# Patient Record
Sex: MALE | Race: WHITE | NOT HISPANIC OR LATINO | URBAN - METROPOLITAN AREA
[De-identification: names, ages, dates, MRNs, and addresses within clinical notes are randomized per-mention and may not be internally consistent; named-entity substitution may affect disease eponyms.]

---

## 2020-02-18 ENCOUNTER — OFFICE VISIT - HEALTHEAST (OUTPATIENT)
Dept: UROLOGY | Facility: CLINIC | Age: 75
End: 2020-02-18

## 2020-02-18 ENCOUNTER — AMBULATORY - HEALTHEAST (OUTPATIENT)
Dept: UROLOGY | Facility: CLINIC | Age: 75
End: 2020-02-18

## 2020-02-18 DIAGNOSIS — N20.1 CALCULUS OF URETER: ICD-10-CM

## 2020-02-18 LAB
ALBUMIN UR-MCNC: NEGATIVE MG/DL
APPEARANCE UR: CLEAR
BILIRUB UR QL STRIP: NEGATIVE
COLOR UR AUTO: YELLOW
GLUCOSE UR STRIP-MCNC: NEGATIVE MG/DL
HGB UR QL STRIP: ABNORMAL
KETONES UR STRIP-MCNC: NEGATIVE MG/DL
LEUKOCYTE ESTERASE UR QL STRIP: NEGATIVE
NITRATE UR QL: NEGATIVE
PH UR STRIP: 7 [PH] (ref 5–8)
SP GR UR STRIP: 1.02 (ref 1–1.03)
UROBILINOGEN UR STRIP-ACNC: ABNORMAL

## 2020-02-18 RX ORDER — ZALEPLON 5 MG/1
5 CAPSULE ORAL
Status: SHIPPED | COMMUNITY
Start: 2019-08-29

## 2020-02-18 RX ORDER — METOPROLOL TARTRATE 50 MG
50 TABLET ORAL
Status: SHIPPED | COMMUNITY
Start: 2018-11-12

## 2020-02-18 RX ORDER — TRAZODONE HYDROCHLORIDE 50 MG/1
100 TABLET, FILM COATED ORAL
Status: SHIPPED | COMMUNITY
Start: 2020-02-01

## 2020-02-18 RX ORDER — ROSUVASTATIN CALCIUM 5 MG/1
5 TABLET, COATED ORAL
Status: SHIPPED | COMMUNITY
Start: 2019-12-14

## 2020-02-18 RX ORDER — ZOLPIDEM TARTRATE 5 MG/1
5 TABLET ORAL
Status: SHIPPED | COMMUNITY
Start: 2019-09-04

## 2020-02-18 RX ORDER — HYDROCORTISONE 25 MG/G
OINTMENT TOPICAL
Status: SHIPPED | COMMUNITY
Start: 2019-03-14

## 2020-02-18 RX ORDER — HYDROCODONE BITARTRATE AND ACETAMINOPHEN 5; 325 MG/1; MG/1
1 TABLET ORAL
Status: SHIPPED | COMMUNITY
Start: 2020-02-06

## 2020-02-18 RX ORDER — TADALAFIL 20 MG/1
20 TABLET ORAL
Status: SHIPPED | COMMUNITY
Start: 2018-06-05

## 2020-02-18 RX ORDER — ALPRAZOLAM 0.25 MG
0.25 TABLET ORAL
Status: SHIPPED | COMMUNITY
Start: 2018-10-30

## 2020-02-18 RX ORDER — LORAZEPAM 0.5 MG/1
0.5 TABLET ORAL
Status: SHIPPED | COMMUNITY
Start: 2019-08-29

## 2020-02-18 ASSESSMENT — MIFFLIN-ST. JEOR: SCORE: 1439.79

## 2020-02-25 ENCOUNTER — OFFICE VISIT - HEALTHEAST (OUTPATIENT)
Dept: UROLOGY | Facility: CLINIC | Age: 75
End: 2020-02-25

## 2020-02-25 ENCOUNTER — HOSPITAL ENCOUNTER (OUTPATIENT)
Dept: CT IMAGING | Facility: CLINIC | Age: 75
Discharge: HOME | End: 2020-02-25
Attending: UROLOGY

## 2020-02-25 DIAGNOSIS — N20.0 CALCULUS OF KIDNEY: ICD-10-CM

## 2020-02-25 DIAGNOSIS — N20.1 CALCULUS OF URETER: ICD-10-CM

## 2020-02-25 LAB
ALBUMIN UR-MCNC: NEGATIVE MG/DL
APPEARANCE UR: CLEAR
BILIRUB UR QL STRIP: NEGATIVE
COLOR UR AUTO: YELLOW
GLUCOSE UR STRIP-MCNC: NEGATIVE MG/DL
HGB UR QL STRIP: ABNORMAL
KETONES UR STRIP-MCNC: NEGATIVE MG/DL
LEUKOCYTE ESTERASE UR QL STRIP: NEGATIVE
NITRATE UR QL: NEGATIVE
PH UR STRIP: 5 [PH] (ref 5–8)
SP GR UR STRIP: 1.02 (ref 1–1.03)
UROBILINOGEN UR STRIP-ACNC: ABNORMAL

## 2020-09-10 ENCOUNTER — OFFICE VISIT - HEALTHEAST (OUTPATIENT)
Dept: UROLOGY | Facility: CLINIC | Age: 75
End: 2020-09-10

## 2020-09-10 ENCOUNTER — HOSPITAL ENCOUNTER (OUTPATIENT)
Dept: CT IMAGING | Facility: CLINIC | Age: 75
Discharge: HOME | End: 2020-09-10
Attending: UROLOGY

## 2020-09-10 DIAGNOSIS — N20.0 CALCULUS OF KIDNEY: ICD-10-CM

## 2021-02-11 ENCOUNTER — HOSPITAL ENCOUNTER (OUTPATIENT)
Dept: CT IMAGING | Facility: HOSPITAL | Age: 76
Discharge: HOME | End: 2021-02-11

## 2021-02-11 ENCOUNTER — OFFICE VISIT - HEALTHEAST (OUTPATIENT)
Dept: UROLOGY | Facility: CLINIC | Age: 76
End: 2021-02-11

## 2021-02-11 DIAGNOSIS — N20.0 CALCULUS OF KIDNEY: ICD-10-CM

## 2021-02-11 DIAGNOSIS — N20.1 CALCULUS OF URETER: ICD-10-CM

## 2021-02-12 RX ORDER — OXYCODONE HYDROCHLORIDE 5 MG/1
5-10 TABLET ORAL EVERY 6 HOURS PRN
Qty: 15 TABLET | Refills: 0 | Status: SHIPPED | OUTPATIENT
Start: 2021-02-12

## 2021-02-12 RX ORDER — ONDANSETRON 4 MG/1
4 TABLET, ORALLY DISINTEGRATING ORAL EVERY 6 HOURS PRN
Qty: 10 TABLET | Refills: 1 | Status: SHIPPED | OUTPATIENT
Start: 2021-02-12

## 2021-02-25 ENCOUNTER — OFFICE VISIT - HEALTHEAST (OUTPATIENT)
Dept: UROLOGY | Facility: CLINIC | Age: 76
End: 2021-02-25

## 2021-02-25 ENCOUNTER — HOSPITAL ENCOUNTER (OUTPATIENT)
Dept: CT IMAGING | Facility: HOSPITAL | Age: 76
Discharge: HOME | End: 2021-02-25
Attending: UROLOGY

## 2021-02-25 DIAGNOSIS — N20.1 CALCULUS OF URETER: ICD-10-CM

## 2021-03-01 ENCOUNTER — COMMUNICATION - HEALTHEAST (OUTPATIENT)
Dept: UROLOGY | Facility: CLINIC | Age: 76
End: 2021-03-01

## 2021-03-11 ENCOUNTER — OFFICE VISIT - HEALTHEAST (OUTPATIENT)
Dept: UROLOGY | Facility: CLINIC | Age: 76
End: 2021-03-11

## 2021-03-11 ENCOUNTER — HOSPITAL ENCOUNTER (OUTPATIENT)
Dept: CT IMAGING | Facility: HOSPITAL | Age: 76
Discharge: HOME | End: 2021-03-11
Attending: UROLOGY

## 2021-03-11 DIAGNOSIS — N20.1 CALCULUS OF URETER: ICD-10-CM

## 2021-03-11 DIAGNOSIS — N20.0 CALCULUS OF KIDNEY: ICD-10-CM

## 2021-05-27 VITALS — DIASTOLIC BLOOD PRESSURE: 70 MMHG | SYSTOLIC BLOOD PRESSURE: 130 MMHG | HEART RATE: 81 BPM | TEMPERATURE: 97.6 F

## 2021-06-04 VITALS
WEIGHT: 168 LBS | SYSTOLIC BLOOD PRESSURE: 153 MMHG | TEMPERATURE: 97.7 F | BODY MASS INDEX: 27 KG/M2 | HEART RATE: 65 BPM | DIASTOLIC BLOOD PRESSURE: 73 MMHG | HEIGHT: 66 IN

## 2021-06-06 NOTE — PROGRESS NOTES
Assessment/Plan:        Diagnoses and all orders for this visit:    Calculus of ureter  -     Urinalysis Macroscopic  -     Symptom Control While Passing a Stone Education  -     Patient Stated Goal: Pass my stone  -     CT Abdomen Pelvis Without Oral Without IV Contrast; Future; Expected date: 02/25/2020    Other orders  -     ALPRAZolam (XANAX) 0.25 MG tablet; Take 0.25 mg by mouth.  -     escitalopram oxalate (LEXAPRO) 10 MG tablet; Take 10 mg by mouth.  -     HYDROcodone-acetaminophen 5-325 mg per tablet; Take 1 tablet by mouth.  -     hydrocortisone 2.5 % ointment; APPLY TO AFFECTED AREA ON THE NECK TWICE A DAY AS NEEDED FOR PAIN FOR UP TO 2 WEEKS.  -     LORazepam (ATIVAN) 0.5 MG tablet; Take 0.5 mg by mouth.  -     metoprolol tartrate (LOPRESSOR) 50 MG tablet; Take 50 mg by mouth.  -     rosuvastatin (CRESTOR) 5 MG tablet; Take 5 mg by mouth.  -     tadalafiL (CIALIS) 20 MG tablet; Take 20 mg by mouth.  -     tamsulosin (FLOMAX) 0.4 mg cap; Take 0.4 mg by mouth.  -     traZODone (DESYREL) 50 MG tablet; Take 100 mg by mouth.  -     zaleplon (SONATA) 5 MG capsule; Take 5 mg by mouth.  -     zolpidem (AMBIEN) 5 MG tablet; Take 5 mg by mouth.      Stone Management Plan  [facility] [place] Stone [facility] 2/18/2020   Urinary Tract Infection No suspicion of infection   Renal Colic Asymptomatic at this time   Renal Failure No suspicion of renal failure   Current CT date 2/7/2020   Right sided stones? Yes   R Number of ureteral stones 1   R GSD of ureteral stones 3   R Location of ureteral stone Proximal   R Number of kidney stones  No renal stones   R Hydronephrosis Mild   R Stone Event New event   Diagnosis date 2/7/2020   Initial location of primary symptomatic stone Proximal   Initial GSD of primary symptomatic stone 3   R MET status Initiation   R Current Plan MET   MET 1 week F/U   Left sided stones? Yes   L Number of ureteral stones No ureteral stones   L Number of kidney stones  1   L GSD of kidney stones 4 - 10   L  Hydronephrosis None   L Stone Event No current event   L Current Plan Observe   Observe rationale Significant stone burden amenable to emergency management             Subjective:      HPI  Mr. Dustin Stewart is a 74 y.o.  male presenting to the [facility] [place] for a new problem.    He is a recurrent stone former who recently had some painless hematuria. He has had ESWL x1 and URS x 2 in the past. Most recent stone episode was about 15 years ago. There is a family history of gout and he has been on allopurinol although his stone composition is not known. Currently no pain and hematuria has resolved. He is a practicing psychiatrist.    CT scan is personally reviewed and demonstrates a 3 mm right proximal stone with minimal hydronephrosis. There is a 7 mm peripheral stone in the left kidney.    PLAN  Will proceed with medical expulsive therapy. Risks and benefits were detailed of medical expulsive therapy including probability of stone passage, recurrent renal colic, and requirement of emergency medical and/or surgical care and further imaging. Patient verbalized understanding. Patient agrees with plan as discussed. He will return in 1 weeks with low dose CT scan.    There are plans to travel to [facility] in early March.    Over the counter symptom control medications of ibuprofen, Dramamine and Tylenol were recommended.     ROS   Review of Systems  A 12 point comprehensive review of systems is negative except for HPI    Past Medical History:   Diagnosis Date     Anxiety      Arrhythmia     svt     Cancer (H)     prostate     Depression      Kidney stone     x4 occ     Osteoarthritis        Past Surgical History:   Procedure Laterality Date     ACHILLES TENDON REPAIR Left      EXTRACORPOREAL SHOCK WAVE LITHOTRIPSY       HIP SURGERY Right      KNEE SURGERY Bilateral      LITHOTRIPSY  2004     AL ANESTH,REPAIR UPPER ABD HERNIA NOS       PROSTATE SURGERY       ROTATOR CUFF REPAIR Right       TONSILLECTOMY AND ADENOIDECTOMY       URETEROSCOPY         Current Outpatient Medications   Medication Sig Dispense Refill     ALPRAZolam (XANAX) 0.25 MG tablet Take 0.25 mg by mouth.       escitalopram oxalate (LEXAPRO) 10 MG tablet Take 10 mg by mouth.       HYDROcodone-acetaminophen 5-325 mg per tablet Take 1 tablet by mouth.       hydrocortisone 2.5 % ointment APPLY TO AFFECTED AREA ON THE NECK TWICE A DAY AS NEEDED FOR PAIN FOR UP TO 2 WEEKS.       LORazepam (ATIVAN) 0.5 MG tablet Take 0.5 mg by mouth.       metoprolol tartrate (LOPRESSOR) 50 MG tablet Take 50 mg by mouth.       rosuvastatin (CRESTOR) 5 MG tablet Take 5 mg by mouth.       tadalafiL (CIALIS) 20 MG tablet Take 20 mg by mouth.       tamsulosin (FLOMAX) 0.4 mg cap Take 0.4 mg by mouth.       traZODone (DESYREL) 50 MG tablet Take 100 mg by mouth.       zaleplon (SONATA) 5 MG capsule Take 5 mg by mouth.       zolpidem (AMBIEN) 5 MG tablet Take 5 mg by mouth.       No current facility-administered medications for this visit.        Allergies   Allergen Reactions     Atorvastatin Myalgia     Ceftriaxone Rash     NOTICED A RASH AFTER IV ANTIBIOTIC GIVEN LAST WEEK WHEN HE WAS ADMITTED-THIS WAS THE ONLY MED. FOUND ON ORDERS       Social History     Socioeconomic History     Marital status:      Spouse name: Not on file     Number of children: Not on file     Years of education: Not on file     Highest education level: Not on file   Occupational History     Occupation: physican psychiatrist   Social Needs     Financial resource strain: Not on file     Food insecurity:     Worry: Not on file     Inability: Not on file     Transportation needs:     Medical: Not on file     Non-medical: Not on file   Tobacco Use     Smoking status: Former Smoker     Smokeless tobacco: Former User   Substance and Sexual Activity     Alcohol use: Yes     Drug use: Not on file     Sexual activity: Not on file   Lifestyle     Physical activity:     Days per week: Not on  file     Minutes per session: Not on file     Stress: Not on file   Relationships     Social connections:     Talks on phone: Not on file     Gets together: Not on file     Attends Church service: Not on file     Active member of club or organization: Not on file     Attends meetings of clubs or organizations: Not on file     Relationship status: Not on file     Intimate partner violence:     Fear of current or ex partner: Not on file     Emotionally abused: Not on file     Physically abused: Not on file     Forced sexual activity: Not on file   Other Topics Concern     Not on file   Social History Narrative     Not on file       Family History   Problem Relation Age of Onset     Heart disease Mother      Urolithiasis Father      Gout Father        Objective:      Physical Exam  Vitals:    02/18/20 1544   BP: 153/73   Pulse: 65   Temp: 97.7  F (36.5  C)     General - well developed, well nourished, appropriate for age. Appears no distress at this time  Abdomen - slender soft, non-tender, no hepatosplenomegaly, no masses.   - no flank tenderness, no suprapubic tenderness, kidney and bladder non-palpable  MSK - normal spinal curvature. no spinal tenderness. normal gait. muscular strength intact.  Psych - oriented to time, place, and person, normal mood and affect.      Labs  Urinalysis POC (Office):  Nitrite, UA   Date Value Ref Range Status   02/18/2020 Negative Negative Final       Lab Urinalysis:  Blood, UA   Date Value Ref Range Status   02/18/2020 Trace (!) Negative Final     Nitrite, UA   Date Value Ref Range Status   02/18/2020 Negative Negative Final     Leukocytes, UA   Date Value Ref Range Status   02/18/2020 Negative Negative Final     pH, UA   Date Value Ref Range Status   02/18/2020 7.0 5.0 - 8.0 Final

## 2021-06-06 NOTE — PROGRESS NOTES
Assessment/Plan:        Diagnoses and all orders for this visit:    Calculus of kidney  -     CT Abdomen Pelvis Without Oral Without IV Contrast; Future; Expected date: 08/25/2020  -     Urinalysis Macroscopic      Stone Management Plan  [place] Stone Management 2/18/2020 2/25/2020   Urinary Tract Infection No suspicion of infection No suspicion of infection   Renal Colic Asymptomatic at this time Asymptomatic at this time   Renal Failure No suspicion of renal failure No suspicion of renal failure   Current CT date 2/7/2020 2/25/2020   Right sided stones? Yes Yes   R Number of ureteral stones 1 No ureteral stones   R GSD of ureteral stones 3 -   R Location of ureteral stone Proximal -   R Number of kidney stones  No renal stones 1   R GSD of kidney stones - 2 - 4   R Hydronephrosis Mild None   R Stone Event New event Established event   Diagnosis date 2/7/2020 -   Initial location of primary symptomatic stone Proximal -   Initial GSD of primary symptomatic stone 3 -   R MET status Initiation (No Data)   R Current Plan MET MET   MET 1 week F/U -   Left sided stones? Yes Yes   L Number of ureteral stones No ureteral stones No ureteral stones   L Number of kidney stones  1 1   L GSD of kidney stones 4 - 10 4 - 10   L Hydronephrosis None None   L Stone Event No current event No current event   L Current Plan Observe -   Observe rationale Significant stone burden amenable to emergency management -             Subjective:      HPI  . Dustin Stewart is a 74 y.o.  male returning to the [facility] [place] for medical expulsive therapy follow up.     On last encounter, his 3 mm stone was in right proximal ureter with mild hydronephrosis. He has had no unanticipated events.    Symptoms have been minimal . He is asymptomatic at present. He denies symptoms of fever, chills, flank pain, nausea, vomiting, urinary frequency and dysuria.     New CT scan was personally reviewed and demonstrates reflux of stone  back into the lower pole with no hydronephrosis.     We had a good discussion. Once a stone starts to move, it generally keeps on moving til resolution. He and his wife have travel planned for [place], [place] in the next couple of weeks. They should be fine for that. Should the right sided stone mobilize, it still stands a good chance of passing with observation. Should the left renal stone mobilize, it will probably require intervention. We discuss that all of these events are inherently unpredictable and could potentially be a work disruption for him.    Will plan on a repeat CT in 6 months as a placeholder and respond to symptoms if they arise.     ROS   Review of systems is negative except for HPI.    Past Medical History:   Diagnosis Date     Anxiety      Arrhythmia     svt     Cancer (H)     prostate     Depression      Kidney stone     x4 occ     Osteoarthritis        Past Surgical History:   Procedure Laterality Date     ACHILLES TENDON REPAIR Left      EXTRACORPOREAL SHOCK WAVE LITHOTRIPSY       HIP SURGERY Right      KNEE SURGERY Bilateral      LITHOTRIPSY  2004     NM ANESTH,REPAIR UPPER ABD HERNIA NOS       PROSTATE SURGERY       ROTATOR CUFF REPAIR Right      TONSILLECTOMY AND ADENOIDECTOMY       URETEROSCOPY         Current Outpatient Medications   Medication Sig Dispense Refill     ALPRAZolam (XANAX) 0.25 MG tablet Take 0.25 mg by mouth.       escitalopram oxalate (LEXAPRO) 10 MG tablet Take 10 mg by mouth.       HYDROcodone-acetaminophen 5-325 mg per tablet Take 1 tablet by mouth.       hydrocortisone 2.5 % ointment APPLY TO AFFECTED AREA ON THE NECK TWICE A DAY AS NEEDED FOR PAIN FOR UP TO 2 WEEKS.       LORazepam (ATIVAN) 0.5 MG tablet Take 0.5 mg by mouth.       metoprolol tartrate (LOPRESSOR) 50 MG tablet Take 50 mg by mouth.       rosuvastatin (CRESTOR) 5 MG tablet Take 5 mg by mouth.       tadalafiL (CIALIS) 20 MG tablet Take 20 mg by mouth.       traZODone (DESYREL) 50 MG tablet Take 100 mg by  mouth.       zaleplon (SONATA) 5 MG capsule Take 5 mg by mouth.       zolpidem (AMBIEN) 5 MG tablet Take 5 mg by mouth.       No current facility-administered medications for this visit.        Allergies   Allergen Reactions     Atorvastatin Myalgia     Ceftriaxone Rash     NOTICED A RASH AFTER IV ANTIBIOTIC GIVEN LAST WEEK WHEN HE WAS ADMITTED-THIS WAS THE ONLY MED. FOUND ON ORDERS       Social History     Socioeconomic History     Marital status:      Spouse name: Not on file     Number of children: Not on file     Years of education: Not on file     Highest education level: Not on file   Occupational History     Occupation: physican psychiatrist   Social Needs     Financial resource strain: Not on file     Food insecurity:     Worry: Not on file     Inability: Not on file     Transportation needs:     Medical: Not on file     Non-medical: Not on file   Tobacco Use     Smoking status: Former Smoker     Smokeless tobacco: Former User   Substance and Sexual Activity     Alcohol use: Yes     Drug use: Not on file     Sexual activity: Not on file   Lifestyle     Physical activity:     Days per week: Not on file     Minutes per session: Not on file     Stress: Not on file   Relationships     Social connections:     Talks on phone: Not on file     Gets together: Not on file     Attends Sabianist service: Not on file     Active member of club or organization: Not on file     Attends meetings of clubs or organizations: Not on file     Relationship status: Not on file     Intimate partner violence:     Fear of current or ex partner: Not on file     Emotionally abused: Not on file     Physically abused: Not on file     Forced sexual activity: Not on file   Other Topics Concern     Not on file   Social History Narrative     Not on file       Family History   Problem Relation Age of Onset     Heart disease Mother      Urolithiasis Father      Gout Father      Objective:      Physical Exam  Vitals:    02/25/20 1412    BP: 130/70   Pulse: 81   Temp: 97.6  F (36.4  C)     General - well developed, well nourished, appropriate for age. Appears no distress at this time  Abdomen - mildly obese soft, non-tender, no hepatosplenomegaly, no masses.   - no flank tenderness, no suprapubic tenderness, kidney and bladder non-palpable  MSK - normal spinal curvature. no spinal tenderness. normal gait. muscular strength intact.  Psych - oriented to time, place, and person, normal mood and affect.      Labs   Urinalysis POC (Office):  Nitrite, UA   Date Value Ref Range Status   02/25/2020 Negative Negative Final   02/18/2020 Negative Negative Final       Lab Urinalysis:  Blood, UA   Date Value Ref Range Status   02/25/2020 Trace (!) Negative Final   02/18/2020 Trace (!) Negative Final     Nitrite, UA   Date Value Ref Range Status   02/25/2020 Negative Negative Final   02/18/2020 Negative Negative Final     Leukocytes, UA   Date Value Ref Range Status   02/25/2020 Negative Negative Final   02/18/2020 Negative Negative Final     pH, UA   Date Value Ref Range Status   02/25/2020 5.0 5.0 - 8.0 Final   02/18/2020 7.0 5.0 - 8.0 Final

## 2021-06-06 NOTE — PATIENT INSTRUCTIONS - HE
Patient Stated Goal: Pass my stone  Symptom Control While Passing A Stone    The goal of Kidney Stone Start is to let a smaller kidney stone (less than 4 to 5 mm) pass without intervention if possible. Giving your body a chance to clear the stone may take a few hours up to a few weeks.  Keeping you well-informed, safe and fairly comfortable is important.    Drink to thirst  Do not attempt to  flush out  your stone by drinking too much fluid. This does not work and may increase nausea. Drink enough to satisfy your body s thirst. Eating your normal diet is fine.   Medications (that may be suggested or prescribed)    Ibuprofen (Advil or Motrin) Available over the counter  o Take two (200mg) tablets every six hours until the stone passes.  o Prevents spasm of the ureter.    o Decreases pain.      Dramamine* (drowsy version, non-generic formulation) Available over the counter  o Take 50mg at bedtime  o Decreases spasms of the ureter  o Decreases nausea  o Decreases acute pain  o Decreases recurrence of pain for 24 hours  o Will help you sleep  *This medication will cause increase drowsiness, do not drive or operate machinery for 6 hours.      Narcotics (Percocet, Vicodin, Dilaudid) Take as prescribed for severe pain unrelieved by ibuprofen and Dramamine  o Narcotics have significant side effects and only  cover-up  pain. They have no effect on the cause of pain.  o Common side effects  - Confusion, disorientation and sedation - DO NOT DRIVE OR OPERATE MACHINERY WITHIN 24 HOURS  - Nausea - take Dramamine or Zofran or Haldol to help control  - Constipation  - Sleep disturbances      Ondansetron (Zofran) Take as prescribed  o Reserve for severe nausea  o May cause constipation, start over the counter Miralax if needed      Second Line Anti-Nausea Medication: Adding a different anti-nausea medication maybe helpful for persistent nausea.  The combined effect of different types of anti-nausea medications maybe more  effective than either medication by itself, even in higher doses.  o Compazine: Take as prescribed      Information about kidney stones    Crystals can form if chemicals are too concentrated in your urine. If the crystal grows over time, a stone may form. A stone usually isn t painful while it is still in the kidney.    As the stone begins to leave the kidney, you may experience episodes of flank pain as the kidney stone approaches the entrance to the ureter. Some people feel a vague ache in the side.    Kidney stones may fall into the ureter. Some stones are tiny and pass through without causing symptoms. The ureter is a small tube (approximately 1/8 of an inch wide). A kidney stone can get stuck and block the ureter. If this happens, urine backs up and flows back to the kidney. Back pressure on the kidney can cause:  o Severe flank pain radiating to the groin.  o Severe nausea and vomiting.  o The pain can occur in the lower back, side, groin or all three.      When the stone reaches the lower ureter, this can irritate the bladder and sensations of feeling the urge to urinate frequently and urgently may occur.      Once the stone passes out of your ureter and into your bladder, the symptoms of urgency and frequency will often disappear. Sometimes pain will come back for a short period and will not be as severe as before. The passage of the stone from your bladder and out of your body is usually not a problem. The urethra is at least twice as wide as the normal ureter, so the stone doesn t usually block it.    Strain all urine  If you pass the stone, save it. Place it in the container we have provided and bring it to the [facility] within a week of passing it. Your stone will then be sent for analysis which takes about a month.     Signs and symptoms you might experience    Nausea    Decreased appetite    Urinary frequency    Bloody urine     Chills    Fatigue    When to call [facility] or  go to the Emergency Room    Fever with a temperature greater than 100.1    Severe pain    Persistent nausea/vomiting    If the pain worsens or nausea/vomiting is uncontrolled with medications, STOP eating & drinking. You need to have an empty stomach for 8 hours prior to surgery. Call the [facility] immediately at [phone].           Follow-up    Low dose CT scan with doctor visit 1-2 weeks after initial clinic visit per doctor s instructions    Please cancel the CT scan visit if you pass a stone. Reschedule for a one month follow-up with doctor to discuss stone composition and future prevention.    Preventing future stones    Approximately a month after your stone is sent out for analysis, a prevention visit will occur with your provider, to discuss an individualized plan for prevention of new stones and to discuss managing stones that you may still have. Along with the analysis of the kidney stone, blood and urine tests may be indicated to develop this plan. Knowing the type of kidney stones you make, and why, allows the providers at the [facility] to recommend specific ways to prevent them.    Follow-up visits are an important part of monitoring and preventing future re-occurrences.    The [facility] is available for questions or concerns 24 hours a day at [phone]

## 2021-06-11 NOTE — PROGRESS NOTES
"Assessment/Plan:        Diagnoses and all orders for this visit:    Calculus of kidney  -     CT Abdomen Pelvis Without Oral Without IV Contrast; Future; Expected date: 09/10/2021      Stone Management Plan  [facility] 2/18/2020 2/25/2020 9/10/2020   Urinary Tract Infection No suspicion of infection No suspicion of infection No suspicion of infection   Renal Colic Asymptomatic at this time Asymptomatic at this time Asymptomatic at this time   Renal Failure No suspicion of renal failure No suspicion of renal failure No suspicion of renal failure   Current CT date 2/7/2020 2/25/2020 9/10/2020   Right sided stones? Yes Yes Yes   R Number of ureteral stones 1 No ureteral stones No ureteral stones   R GSD of ureteral stones 3 - -   R Location of ureteral stone Proximal - -   R Number of kidney stones  No renal stones 1 1   R GSD of kidney stones - 2 - 4 2 - 4   R Hydronephrosis Mild None None   R Stone Event New event Established event Resolved event   Diagnosis date 2/7/2020 - -   Initial location of primary symptomatic stone Proximal - -   Initial GSD of primary symptomatic stone 3 - -   Resolved date - - 9/10/2020   R MET status Initiation (No Data) -   R Current Plan MET MET -   MET 1 week F/U - -   Left sided stones? Yes Yes Yes   L Number of ureteral stones No ureteral stones No ureteral stones No ureteral stones   L Number of kidney stones  1 1 1   L GSD of kidney stones 4 - 10 4 - 10 4 - 10   L Hydronephrosis None None None   L Stone Event No current event No current event No current event   L Current Plan Observe - -   Observe rationale Significant stone burden amenable to emergency management - -             Phone call duration: 12 minutes    Neto Urban MD     Subjective:      The patient has been notified of following:     \"This telephone visit will be conducted via a call between you and your physician/provider. We have found that certain health care needs can be provided without the need for " "a physical exam.  This service lets us provide the care you need with a short phone conversation.  If a prescription is necessary we can send it directly to your pharmacy.  If labs and/or imaging are needed, we can place orders so you can have the test (s) done at a later time.    If during the course of the call the physician/provider feels a telephone visit is not appropriate, you will not be charged for this service.\"     HPI  MrSaran Stewart is a 74 y.o.  male who is being evaluated via a billable telephone visit by [facility] [place] for follow up of his stone disease.    No issues in the interim with right ureteral stone which refluxed to kidney.    CT demonstrates persistent small right lower pole stone and stable left renal stone.    Will follow in one year with low dose CT and see hime back earlier should issues develop.     ROS   Review of systems is negative except for HPI.    Past Medical History:   Diagnosis Date     Anxiety      Arrhythmia     svt     Cancer (H)     prostate     Depression      Kidney stone     x4 occ     Osteoarthritis        Past Surgical History:   Procedure Laterality Date     ACHILLES TENDON REPAIR Left      EXTRACORPOREAL SHOCK WAVE LITHOTRIPSY       HIP SURGERY Right      KNEE SURGERY Bilateral      LITHOTRIPSY  2004     MD ANESTH,REPAIR UPPER ABD HERNIA NOS       PROSTATE SURGERY       ROTATOR CUFF REPAIR Right      TONSILLECTOMY AND ADENOIDECTOMY       URETEROSCOPY         Current Outpatient Medications   Medication Sig Dispense Refill     ALPRAZolam (XANAX) 0.25 MG tablet Take 0.25 mg by mouth.       HYDROcodone-acetaminophen 5-325 mg per tablet Take 1 tablet by mouth.       hydrocortisone 2.5 % ointment APPLY TO AFFECTED AREA ON THE NECK TWICE A DAY AS NEEDED FOR PAIN FOR UP TO 2 WEEKS.       LORazepam (ATIVAN) 0.5 MG tablet Take 0.5 mg by mouth.       metoprolol tartrate (LOPRESSOR) 50 MG tablet Take 50 mg by mouth.       rosuvastatin " (CRESTOR) 5 MG tablet Take 5 mg by mouth.       tadalafiL (CIALIS) 20 MG tablet Take 20 mg by mouth.       traZODone (DESYREL) 50 MG tablet Take 100 mg by mouth.       zaleplon (SONATA) 5 MG capsule Take 5 mg by mouth.       zolpidem (AMBIEN) 5 MG tablet Take 5 mg by mouth.       No current facility-administered medications for this visit.        Allergies   Allergen Reactions     Atorvastatin Myalgia     Ceftriaxone Rash     NOTICED A RASH AFTER IV ANTIBIOTIC GIVEN LAST WEEK WHEN HE WAS ADMITTED-THIS WAS THE ONLY MED. FOUND ON ORDERS       Social History     Socioeconomic History     Marital status:      Spouse name: Not on file     Number of children: Not on file     Years of education: Not on file     Highest education level: Not on file   Occupational History     Occupation: physican psychiatrist   Social Needs     Financial resource strain: Not on file     Food insecurity     Worry: Not on file     Inability: Not on file     Transportation needs     Medical: Not on file     Non-medical: Not on file   Tobacco Use     Smoking status: Former Smoker     Smokeless tobacco: Former User   Substance and Sexual Activity     Alcohol use: Yes     Drug use: Not on file     Sexual activity: Not on file   Lifestyle     Physical activity     Days per week: Not on file     Minutes per session: Not on file     Stress: Not on file   Relationships     Social connections     Talks on phone: Not on file     Gets together: Not on file     Attends Nondenominational service: Not on file     Active member of club or organization: Not on file     Attends meetings of clubs or organizations: Not on file     Relationship status: Not on file     Intimate partner violence     Fear of current or ex partner: Not on file     Emotionally abused: Not on file     Physically abused: Not on file     Forced sexual activity: Not on file   Other Topics Concern     Not on file   Social History Narrative     Not on file       Family History   Problem  Relation Age of Onset     Heart disease Mother      Urolithiasis Father      Gout Father        Objective:      Labs   Urinalysis POC (Office):  Nitrite, UA   Date Value Ref Range Status   02/25/2020 Negative Negative Final   02/18/2020 Negative Negative Final       Lab Urinalysis:  Blood, UA   Date Value Ref Range Status   02/25/2020 Trace (!) Negative Final   02/18/2020 Trace (!) Negative Final     Nitrite, UA   Date Value Ref Range Status   02/25/2020 Negative Negative Final   02/18/2020 Negative Negative Final     Leukocytes, UA   Date Value Ref Range Status   02/25/2020 Negative Negative Final   02/18/2020 Negative Negative Final     pH, UA   Date Value Ref Range Status   02/25/2020 5.0 5.0 - 8.0 Final   02/18/2020 7.0 5.0 - 8.0 Final

## 2021-06-15 NOTE — PROGRESS NOTES
Assessment/Plan:    Assessment & Plan   Dustin was seen today for new problem - self referred.    Diagnoses and all orders for this visit:    Calculus of ureter  -     Symptom Control While Passing a Stone Education  -     Patient Stated Goal: Pass my stone  -     CT Abdomen Pelvis Without Oral Without IV Contrast; Future  -     ondansetron (ZOFRAN-ODT) 4 MG disintegrating tablet; Take 1 tablet (4 mg total) by mouth every 6 (six) hours as needed for nausea.  -     oxyCODONE (ROXICODONE) 5 MG immediate release tablet; Take 1-2 tablets (5-10 mg total) by mouth every 6 (six) hours as needed for pain.        Stone Management Plan  [facility] 2/25/2020 9/10/2020 2/12/2021   Urinary Tract Infection No suspicion of infection No suspicion of infection No suspicion of infection   Renal Colic Asymptomatic at this time Asymptomatic at this time Well controlled symptoms   Renal Failure No suspicion of renal failure No suspicion of renal failure No suspicion of renal failure   Current CT date 2/25/2020 9/10/2020 2/11/2021   Right sided stones? Yes Yes Yes   R Number of ureteral stones No ureteral stones No ureteral stones 1   R GSD of ureteral stones - - 4   R Location of ureteral stone - - Proximal   R Number of kidney stones  1 1 No renal stones   R GSD of kidney stones 2 - 4 2 - 4 -   R Hydronephrosis None None Mild   R Stone Event Established event Resolved event New event   Diagnosis date - - 2/11/2021   Initial location of primary symptomatic stone - - Proximal   Initial GSD of primary symptomatic stone - - 4   Resolved date - 9/10/2020 -   R MET status (No Data) - Initiation   R Current Plan MET - -   MET - - -   Left sided stones? Yes Yes Yes   L Number of ureteral stones No ureteral stones No ureteral stones No ureteral stones   L Number of kidney stones  1 1 1   L GSD of kidney stones 4 - 10 4 - 10 4 - 10   L Hydronephrosis None None None   L Stone Event No current event No current event No current event   L  Current Plan - - Observe   Observe rationale - - Significant stone burden amenable to emergency management             PLAN    Return in about 2 weeks (around 2/25/2021) for CT scan.    Phone call duration: 15 minutes  25 minutes spent on the date of the encounter doing chart review, history and exam, documentation and further activities as noted above    Neto Urbna MD  [facility]    Subjective:      HPI  Mr. Dustin Stewart is a 75 y.o.  male who is being evaluated via a billable telephone visit by [facility] [place] for unanticipated visit with acute exacerbation of chronic stone disease.      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 He then experienced sudden onset, severe, right flank pain with significant nausea beginning yesterday. Attempted to self medicate but was unable to keep meds down. No fever or chills. No voiding symptoms. Pain and nausea have largely resolved since yesterday.    CT scan is personally reviewed and demonstrates migration of a 4 mm renal stone into the right ureter.    Will proceed with medical expulsive therapy. Risks and benefits were detailed of medical expulsive therapy including probability of stone passage, recurrent renal colic, and requirement of emergency medical and/or surgical care and further imaging. Patient verbalized understanding. Patient agrees with plan as discussed. He will return in 2 weeks with low dose CT scan.    For symptom control, he was prescribed oxycodone and ondansetron. Over the counter symptom control medications of ibuprofen, Dramamine and Tylenol were recommended.         ROS   Review of systems is negative except for HPI.    Past Medical History:   Diagnosis Date     Anxiety      Arrhythmia     svt     Cancer (H)     prostate     Depression      Kidney stone     x4 occ     Osteoarthritis        Past Surgical History:   Procedure Laterality Date      ACHILLES TENDON REPAIR Left      EXTRACORPOREAL SHOCK WAVE LITHOTRIPSY       HIP SURGERY Right      KNEE SURGERY Bilateral      LITHOTRIPSY  2004     [place] ANESTH,REPAIR UPPER ABD HERNIA NOS       PROSTATE SURGERY       ROTATOR CUFF REPAIR Right      TONSILLECTOMY AND ADENOIDECTOMY       URETEROSCOPY         Current Outpatient Medications   Medication Sig Dispense Refill     ALPRAZolam (XANAX) 0.25 MG tablet Take 0.25 mg by mouth.       HYDROcodone-acetaminophen 5-325 mg per tablet Take 1 tablet by mouth.       hydrocortisone 2.5 % ointment APPLY TO AFFECTED AREA ON THE NECK TWICE A DAY AS NEEDED FOR PAIN FOR UP TO 2 WEEKS.       LORazepam (ATIVAN) 0.5 MG tablet Take 0.5 mg by mouth.       metoprolol tartrate (LOPRESSOR) 50 MG tablet Take 50 mg by mouth.       rosuvastatin (CRESTOR) 5 MG tablet Take 5 mg by mouth.       tadalafiL (CIALIS) 20 MG tablet Take 20 mg by mouth.       traZODone (DESYREL) 50 MG tablet Take 100 mg by mouth.       zaleplon (SONATA) 5 MG capsule Take 5 mg by mouth.       zolpidem (AMBIEN) 5 MG tablet Take 5 mg by mouth.       ondansetron (ZOFRAN-ODT) 4 MG disintegrating tablet Take 1 tablet (4 mg total) by mouth every 6 (six) hours as needed for nausea. 10 tablet 1     oxyCODONE (ROXICODONE) 5 MG immediate release tablet Take 1-2 tablets (5-10 mg total) by mouth every 6 (six) hours as needed for pain. 15 tablet 0     No current facility-administered medications for this visit.        Allergies   Allergen Reactions     Atorvastatin Myalgia     Ceftriaxone Rash     NOTICED A RASH AFTER IV ANTIBIOTIC GIVEN LAST WEEK WHEN HE WAS ADMITTED-THIS WAS THE ONLY MED. FOUND ON ORDERS       Social History     Socioeconomic History     Marital status:      Spouse name: Not on file     Number of children: Not on file     Years of education: Not on file     Highest education level: Not on file   Occupational History     Occupation: physican psychiatrist   Social Needs     Financial resource strain:  Not on file     Food insecurity     Worry: Not on file     Inability: Not on file     Transportation needs     Medical: Not on file     Non-medical: Not on file   Tobacco Use     Smoking status: Former Smoker     Smokeless tobacco: Former User   Substance and Sexual Activity     Alcohol use: Yes     Drug use: Not on file     Sexual activity: Not on file   Lifestyle     Physical activity     Days per week: Not on file     Minutes per session: Not on file     Stress: Not on file   Relationships     Social connections     Talks on phone: Not on file     Gets together: Not on file     Attends Jain service: Not on file     Active member of club or organization: Not on file     Attends meetings of clubs or organizations: Not on file     Relationship status: Not on file     Intimate partner violence     Fear of current or ex partner: Not on file     Emotionally abused: Not on file     Physically abused: Not on file     Forced sexual activity: Not on file   Other Topics Concern     Not on file   Social History Narrative     Not on file       Family History   Problem Relation Age of Onset     Heart disease Mother      Urolithiasis Father      Gout Father        Objective:      No vitals or physical exam obtained due to virtual visit  Labs   Urinalysis POC (Office):  Nitrite, UA   Date Value Ref Range Status   02/25/2020 Negative Negative Final   02/18/2020 Negative Negative Final       Lab Urinalysis:  Blood, UA   Date Value Ref Range Status   02/25/2020 Trace (!) Negative Final   02/18/2020 Trace (!) Negative Final     Nitrite, UA   Date Value Ref Range Status   02/25/2020 Negative Negative Final   02/18/2020 Negative Negative Final     Leukocytes, UA   Date Value Ref Range Status   02/25/2020 Negative Negative Final   02/18/2020 Negative Negative Final     pH, UA   Date Value Ref Range Status   02/25/2020 5.0 5.0 - 8.0 Final   02/18/2020 7.0 5.0 - 8.0 Final    and Acute Labs CBC No results found for: WBC, HGB, PLT, C  Reactive Protein  No results found for: CRP, Renal Panel  KSINo results found for: CREATININE, CRCLEARANCE, K, CALCIUM and Urine Culture  No results found for: CULTURE

## 2021-06-15 NOTE — PROGRESS NOTES
Assessment/Plan:    Assessment & Plan   Dustin was seen today for met follow up.    Diagnoses and all orders for this visit:    Calculus of ureter  -     Symptom Control While Passing a Stone Education  -     Patient Stated Goal: Pass my stone  -     CT Abdomen Pelvis Without Oral Without IV Contrast; Future        Stone Management Plan  [facility] 9/10/2020 2/12/2021 2/25/2021   Urinary Tract Infection No suspicion of infection No suspicion of infection No suspicion of infection   Renal Colic Asymptomatic at this time Well controlled symptoms Asymptomatic at this time   Renal Failure No suspicion of renal failure No suspicion of renal failure No suspicion of renal failure   Current CT date 9/10/2020 2/11/2021 2/25/2021   Right sided stones? Yes Yes Yes   R Number of ureteral stones No ureteral stones 1 1   R GSD of ureteral stones - 4 4   R Location of ureteral stone - Proximal Proximal   R Number of kidney stones  1 No renal stones No renal stones   R GSD of kidney stones 2 - 4 - -   R Hydronephrosis None Mild Mild   R Stone Event Resolved event New event Established event   Diagnosis date - 2/11/2021 -   Initial location of primary symptomatic stone - Proximal -   Initial GSD of primary symptomatic stone - 4 -   Resolved date 9/10/2020 - -   R MET status - Initiation No progression   R Current Plan - - MET   MET - - 2 week F/U   Left sided stones? Yes Yes -   L Number of ureteral stones No ureteral stones No ureteral stones -   L Number of kidney stones  1 1 1   L GSD of kidney stones 4 - 10 4 - 10 4 - 10   L Hydronephrosis None None None   L Stone Event No current event No current event No current event   L Current Plan - Observe -   Observe rationale - Significant stone burden amenable to emergency management -             PLAN    Return in about 2 weeks (around 3/11/2021) for CT scan.    Phone call duration: 10 minutes  13 minutes spent on the date of the encounter doing chart review, history and  exam, documentation and further activities as noted above    Neto Urban MD  [facility]    Subjective:      HPI  Mr. Dustin Stewart is a 75 y.o.  male who is being evaluated via a billable telephone visit by [facility] [place] for medical expulsive therapy follow up.     On last encounter, his 4 mm stone was in right proximal ureter with mild hydronephrosis. He has had no unanticipated events.    Symptoms have been minimal . He is asymptomatic at present. He denies symptoms of fever, chills, flank pain, nausea, vomiting, urinary frequency and dysuria.     New CT scan was personally reviewed and demonstrates no progression of stone with stable mild hydronephrosis.     He will continue to attempt to pass stone and will return in 2 weeks with further imaging. .         ROS   Review of systems is negative except for HPI.    Past Medical History:   Diagnosis Date     Anxiety      Arrhythmia     svt     Cancer (H)     prostate     Depression      Kidney stone     x4 occ     Osteoarthritis        Past Surgical History:   Procedure Laterality Date     ACHILLES TENDON REPAIR Left      EXTRACORPOREAL SHOCK WAVE LITHOTRIPSY       HIP SURGERY Right      KNEE SURGERY Bilateral      LITHOTRIPSY  2004     [place] ANESTH,REPAIR UPPER ABD HERNIA NOS       PROSTATE SURGERY       ROTATOR CUFF REPAIR Right      TONSILLECTOMY AND ADENOIDECTOMY       URETEROSCOPY         Current Outpatient Medications   Medication Sig Dispense Refill     ALPRAZolam (XANAX) 0.25 MG tablet Take 0.25 mg by mouth.       HYDROcodone-acetaminophen 5-325 mg per tablet Take 1 tablet by mouth.       hydrocortisone 2.5 % ointment APPLY TO AFFECTED AREA ON THE NECK TWICE A DAY AS NEEDED FOR PAIN FOR UP TO 2 WEEKS.       LORazepam (ATIVAN) 0.5 MG tablet Take 0.5 mg by mouth.       metoprolol tartrate (LOPRESSOR) 50 MG tablet Take 50 mg by mouth.       ondansetron (ZOFRAN-ODT) 4 MG disintegrating  tablet Take 1 tablet (4 mg total) by mouth every 6 (six) hours as needed for nausea. 10 tablet 1     oxyCODONE (ROXICODONE) 5 MG immediate release tablet Take 1-2 tablets (5-10 mg total) by mouth every 6 (six) hours as needed for pain. 15 tablet 0     rosuvastatin (CRESTOR) 5 MG tablet Take 5 mg by mouth.       tadalafiL (CIALIS) 20 MG tablet Take 20 mg by mouth.       traZODone (DESYREL) 50 MG tablet Take 100 mg by mouth.       zaleplon (SONATA) 5 MG capsule Take 5 mg by mouth.       zolpidem (AMBIEN) 5 MG tablet Take 5 mg by mouth.       No current facility-administered medications for this visit.        Allergies   Allergen Reactions     Atorvastatin Myalgia     Ceftriaxone Rash     NOTICED A RASH AFTER IV ANTIBIOTIC GIVEN LAST WEEK WHEN HE WAS ADMITTED-THIS WAS THE ONLY MED. FOUND ON ORDERS       Social History     Socioeconomic History     Marital status:      Spouse name: Not on file     Number of children: Not on file     Years of education: Not on file     Highest education level: Not on file   Occupational History     Occupation: physican psychiatrist   Social Needs     Financial resource strain: Not on file     Food insecurity     Worry: Not on file     Inability: Not on file     Transportation needs     Medical: Not on file     Non-medical: Not on file   Tobacco Use     Smoking status: Former Smoker     Smokeless tobacco: Former User   Substance and Sexual Activity     Alcohol use: Yes     Drug use: Not on file     Sexual activity: Not on file   Lifestyle     Physical activity     Days per week: Not on file     Minutes per session: Not on file     Stress: Not on file   Relationships     Social connections     Talks on phone: Not on file     Gets together: Not on file     Attends Church service: Not on file     Active member of club or organization: Not on file     Attends meetings of clubs or organizations: Not on file     Relationship status: Not on file     Intimate partner violence     Fear  of current or ex partner: Not on file     Emotionally abused: Not on file     Physically abused: Not on file     Forced sexual activity: Not on file   Other Topics Concern     Not on file   Social History Narrative     Not on file       Family History   Problem Relation Age of Onset     Heart disease Mother      Urolithiasis Father      Gout Father        Objective:      No vitals or physical exam obtained due to virtual visit  Labs   Urinalysis POC (Office):  Nitrite, UA   Date Value Ref Range Status   02/25/2020 Negative Negative Final   02/18/2020 Negative Negative Final       Lab Urinalysis:  Blood, UA   Date Value Ref Range Status   02/25/2020 Trace (!) Negative Final   02/18/2020 Trace (!) Negative Final     Nitrite, UA   Date Value Ref Range Status   02/25/2020 Negative Negative Final   02/18/2020 Negative Negative Final     Leukocytes, UA   Date Value Ref Range Status   02/25/2020 Negative Negative Final   02/18/2020 Negative Negative Final     pH, UA   Date Value Ref Range Status   02/25/2020 5.0 5.0 - 8.0 Final   02/18/2020 7.0 5.0 - 8.0 Final    and Acute Labs Urine Culture  No results found for: CULTURE

## 2021-06-15 NOTE — PATIENT INSTRUCTIONS - HE
Patient Stated Goal: Pass my stone  Symptom Control While Passing A Stone    The goal of [facility] is to let a smaller kidney stone (less than 4 to 5 mm) pass without intervention if possible. Giving your body a chance to clear the stone may take a few hours up to a few weeks.  Keeping you well-informed, safe and fairly comfortable is important.    Drink to thirst  Do not attempt to  flush out  your stone by drinking too much fluid. This does not work and may increase nausea. Drink enough to satisfy your body s thirst. Eating your normal diet is fine.   Medications (that may be suggested or prescribed)    Ibuprofen (Advil or Motrin) Available over the counter  o Take two (200mg) tablets every six hours until the stone passes.  o Prevents spasm of the ureter.    o Decreases pain.      Dramamine* (drowsy version, non-generic formulation) Available over the counter  o Take 50mg at bedtime  o Decreases spasms of the ureter  o Decreases nausea  o Decreases acute pain  o Decreases recurrence of pain for 24 hours  o Will help you sleep  *This medication will cause increase drowsiness, do not drive or operate machinery for 6 hours.      Narcotics (Percocet, Vicodin, Dilaudid) Take as prescribed for severe pain unrelieved by ibuprofen and Dramamine  o Narcotics have significant side effects and only  cover-up  pain. They have no effect on the cause of pain.  o Common side effects  - Confusion, disorientation and sedation - DO NOT DRIVE OR OPERATE MACHINERY WITHIN 24 HOURS  - Nausea - take Dramamine or Zofran or Haldol to help control  - Constipation  - Sleep disturbances      Ondansetron (Zofran) Take as prescribed  o Reserve for severe nausea  o May cause constipation, start over the counter Miralax if needed      Second Line Anti-Nausea Medication: Adding a different anti-nausea medication maybe helpful for persistent nausea.  The combined effect of different types of anti-nausea medications maybe more  effective than either medication by itself, even in higher doses.  o Compazine: Take as prescribed      Information about kidney stones    Crystals can form if chemicals are too concentrated in your urine. If the crystal grows over time, a stone may form. A stone usually isn t painful while it is still in the kidney.    As the stone begins to leave the kidney, you may experience episodes of flank pain as the kidney stone approaches the entrance to the ureter. Some people feel a vague ache in the side.    Kidney stones may fall into the ureter. Some stones are tiny and pass through without causing symptoms. The ureter is a small tube (approximately 1/8 of an inch wide). A kidney stone can get stuck and block the ureter. If this happens, urine backs up and flows back to the kidney. Back pressure on the kidney can cause:  o Severe flank pain radiating to the groin.  o Severe nausea and vomiting.  o The pain can occur in the lower back, side, groin or all three.      When the stone reaches the lower ureter, this can irritate the bladder and sensations of feeling the urge to urinate frequently and urgently may occur.      Once the stone passes out of your ureter and into your bladder, the symptoms of urgency and frequency will often disappear. Sometimes pain will come back for a short period and will not be as severe as before. The passage of the stone from your bladder and out of your body is usually not a problem. The urethra is at least twice as wide as the normal ureter, so the stone doesn t usually block it.    Strain all urine  If you pass the stone, save it. Place it in the container we have provided and bring it to the [facility] within a week of passing it. Your stone will then be sent for analysis which takes about a month.     Signs and symptoms you might experience    Nausea    Decreased appetite    Urinary frequency    Bloody urine     Chills    Fatigue    When to call [facility] or  go to the Emergency Room    Fever with a temperature greater than 100.1    Severe pain    Persistent nausea/vomiting    If the pain worsens or nausea/vomiting is uncontrolled with medications, STOP eating & drinking. You need to have an empty stomach for 8 hours prior to surgery. Call the [facility] immediately at [phone].           Follow-up    Low dose CT scan with doctor visit 1-2 weeks after initial clinic visit per doctor s instructions    Please cancel the CT scan visit if you pass a stone. Reschedule for a one month follow-up with doctor to discuss stone composition and future prevention.    Preventing future stones    Approximately a month after your stone is sent out for analysis, a prevention visit will occur with your provider, to discuss an individualized plan for prevention of new stones and to discuss managing stones that you may still have. Along with the analysis of the kidney stone, blood and urine tests may be indicated to develop this plan. Knowing the type of kidney stones you make, and why, allows the providers at the [facility] to recommend specific ways to prevent them.    Follow-up visits are an important part of monitoring and preventing future re-occurrences.    [facility] is available for questions or concerns 24 hours a day at [phone]

## 2021-06-15 NOTE — PROGRESS NOTES
Assessment/Plan:    Assessment & Plan   Dustin was seen today for met follow up.    Diagnoses and all orders for this visit:    Calculus of kidney  -     Patient Stated Goal: Prevent further stones  -     CT Abdomen Pelvis Without Oral Without IV Contrast; Future        Stone Management Plan  [facility] 2/12/2021 2/25/2021 3/11/2021   Urinary Tract Infection No suspicion of infection No suspicion of infection No suspicion of infection   Renal Colic Well controlled symptoms Asymptomatic at this time Asymptomatic at this time   Renal Failure No suspicion of renal failure No suspicion of renal failure No suspicion of renal failure   Current CT date 2/11/2021 2/25/2021 3/11/2021   Right sided stones? Yes Yes No   R Number of ureteral stones 1 1 -   R GSD of ureteral stones 4 4 -   R Location of ureteral stone Proximal Proximal -   R Number of kidney stones  No renal stones No renal stones -   R GSD of kidney stones - - -   R Hydronephrosis Mild Mild -   R Stone Event New event Established event Resolved event   Diagnosis date 2/11/2021 - -   Initial location of primary symptomatic stone Proximal - -   Initial GSD of primary symptomatic stone 4 - -   Resolved date - - 3/11/2021   R MET status Initiation No progression Passed   R Current Plan - MET -   MET - 2 week F/U -   Left sided stones? Yes - -   L Number of ureteral stones No ureteral stones - -   L Number of kidney stones  1 1 1   L GSD of kidney stones 4 - 10 4 - 10 4 - 10   L Hydronephrosis None None None   L Stone Event No current event No current event No current event   L Current Plan Observe - Observe   Observe rationale Significant stone burden amenable to emergency management - Limited stone burden with good prognosis for spontaneous passage             PLAN    No follow-ups on file.    Phone call duration: 10 minutes  15   minutes spent on the date of the encounter doing chart review, history and exam, documentation and further activities as  noted above    Neto Urban MD  [facility]    Subjective:      HPI  . Dustin Stewart is a 75 y.o.  male who is being evaluated via a billable telephone visit by [facility] for medical expulsive therapy follow up.     On last encounter, his 4 mm stone was in left proximal ureter with mild hydronephrosis. He has had no unanticipated events.    Symptoms have been minimal . He is asymptomatic at present. He denies symptoms of fever, chills, flank pain, nausea, vomiting, urinary frequency and dysuria.     New CT scan was personally reviewed and demonstrates passage of stone with resolution of previous hydronephrosis.     He is congratulated on passing his stone and will not proceed with stone risk evaluation .    Will follow his left renal stone with Ct in one year.         ROS   Review of systems is negative except for HPI.    Past Medical History:   Diagnosis Date     Anxiety      Arrhythmia     svt     Cancer (H)     prostate     Depression      Kidney stone     x4 occ     Osteoarthritis        Past Surgical History:   Procedure Laterality Date     ACHILLES TENDON REPAIR Left      EXTRACORPOREAL SHOCK WAVE LITHOTRIPSY       HIP SURGERY Right      KNEE SURGERY Bilateral      LITHOTRIPSY  2004     DE ANESTH,REPAIR UPPER ABD HERNIA NOS       PROSTATE SURGERY       ROTATOR CUFF REPAIR Right      TONSILLECTOMY AND ADENOIDECTOMY       URETEROSCOPY         Current Outpatient Medications   Medication Sig Dispense Refill     ALPRAZolam (XANAX) 0.25 MG tablet Take 0.25 mg by mouth.       hydrocortisone 2.5 % ointment APPLY TO AFFECTED AREA ON THE NECK TWICE A DAY AS NEEDED FOR PAIN FOR UP TO 2 WEEKS.       LORazepam (ATIVAN) 0.5 MG tablet Take 0.5 mg by mouth.       metoprolol tartrate (LOPRESSOR) 50 MG tablet Take 50 mg by mouth.       rosuvastatin (CRESTOR) 5 MG tablet Take 5 mg by mouth.       tadalafiL (CIALIS) 20 MG tablet Take 20 mg by  mouth.       traZODone (DESYREL) 50 MG tablet Take 100 mg by mouth.       zaleplon (SONATA) 5 MG capsule Take 5 mg by mouth.       zolpidem (AMBIEN) 5 MG tablet Take 5 mg by mouth.       HYDROcodone-acetaminophen 5-325 mg per tablet Take 1 tablet by mouth.       ondansetron (ZOFRAN-ODT) 4 MG disintegrating tablet Take 1 tablet (4 mg total) by mouth every 6 (six) hours as needed for nausea. 10 tablet 1     oxyCODONE (ROXICODONE) 5 MG immediate release tablet Take 1-2 tablets (5-10 mg total) by mouth every 6 (six) hours as needed for pain. 15 tablet 0     No current facility-administered medications for this visit.        Allergies   Allergen Reactions     Atorvastatin Myalgia     Ceftriaxone Rash     NOTICED A RASH AFTER IV ANTIBIOTIC GIVEN LAST WEEK WHEN HE WAS ADMITTED-THIS WAS THE ONLY MED. FOUND ON ORDERS       Social History     Socioeconomic History     Marital status:      Spouse name: Not on file     Number of children: Not on file     Years of education: Not on file     Highest education level: Not on file   Occupational History     Occupation: physican psychiatrist   Social Needs     Financial resource strain: Not on file     Food insecurity     Worry: Not on file     Inability: Not on file     Transportation needs     Medical: Not on file     Non-medical: Not on file   Tobacco Use     Smoking status: Former Smoker     Smokeless tobacco: Former User   Substance and Sexual Activity     Alcohol use: Yes     Drug use: Not on file     Sexual activity: Not on file   Lifestyle     Physical activity     Days per week: Not on file     Minutes per session: Not on file     Stress: Not on file   Relationships     Social connections     Talks on phone: Not on file     Gets together: Not on file     Attends [employer] service: Not on file     Active member of club or organization: Not on file     Attends meetings of clubs or organizations: Not on file     Relationship status: Not on file     Intimate partner  violence     Fear of current or ex partner: Not on file     Emotionally abused: Not on file     Physically abused: Not on file     Forced sexual activity: Not on file   Other Topics Concern     Not on file   Social History Narrative     Not on file       Family History   Problem Relation Age of Onset     Heart disease Mother      Urolithiasis Father      Gout Father        Objective:      No vitals or physical exam obtained due to virtual visit  Labs   Urinalysis POC (Office):  Nitrite, UA   Date Value Ref Range Status   02/25/2020 Negative Negative Final   02/18/2020 Negative Negative Final       Lab Urinalysis:  Blood, UA   Date Value Ref Range Status   02/25/2020 Trace (!) Negative Final   02/18/2020 Trace (!) Negative Final     Nitrite, UA   Date Value Ref Range Status   02/25/2020 Negative Negative Final   02/18/2020 Negative Negative Final     Leukocytes, UA   Date Value Ref Range Status   02/25/2020 Negative Negative Final   02/18/2020 Negative Negative Final     pH, UA   Date Value Ref Range Status   02/25/2020 5.0 5.0 - 8.0 Final   02/18/2020 7.0 5.0 - 8.0 Final    and Acute Labs Urine Culture  No results found for: CULTURE

## 2024-10-06 NOTE — PATIENT INSTRUCTIONS - HE
Patient Stated Goal: Pass my stone  Symptom Control While Passing A Stone    The goal of [facility] is to let a smaller kidney stone (less than 4 to 5 mm) pass without intervention if possible. Giving your body a chance to clear the stone may take a few hours up to a few weeks.  Keeping you well-informed, safe and fairly comfortable is important.    Drink to thirst  Do not attempt to  flush out  your stone by drinking too much fluid. This does not work and may increase nausea. Drink enough to satisfy your body s thirst. Eating your normal diet is fine.   Medications (that may be suggested or prescribed)    Ibuprofen (Advil or Motrin) Available over the counter  o Take two (200mg) tablets every six hours until the stone passes.  o Prevents spasm of the ureter.    o Decreases pain.      Dramamine* (drowsy version, non-generic formulation) Available over the counter  o Take 50mg at bedtime  o Decreases spasms of the ureter  o Decreases nausea  o Decreases acute pain  o Decreases recurrence of pain for 24 hours  o Will help you sleep  *This medication will cause increase drowsiness, do not drive or operate machinery for 6 hours.      Narcotics (Percocet, Vicodin, Dilaudid) Take as prescribed for severe pain unrelieved by ibuprofen and Dramamine  o Narcotics have significant side effects and only  cover-up  pain. They have no effect on the cause of pain.  o Common side effects  - Confusion, disorientation and sedation - DO NOT DRIVE OR OPERATE MACHINERY WITHIN 24 HOURS  - Nausea - take Dramamine or Zofran or Haldol to help control  - Constipation  - Sleep disturbances      Ondansetron (Zofran) Take as prescribed  o Reserve for severe nausea  o May cause constipation, start over the counter Miralax if needed      Second Line Anti-Nausea Medication: Adding a different anti-nausea medication maybe helpful for persistent nausea.  The combined effect of different types of anti-nausea medications maybe more  effective than either medication by itself, even in higher doses.  o Compazine: Take as prescribed      Information about kidney stones    Crystals can form if chemicals are too concentrated in your urine. If the crystal grows over time, a stone may form. A stone usually isn t painful while it is still in the kidney.    As the stone begins to leave the kidney, you may experience episodes of flank pain as the kidney stone approaches the entrance to the ureter. Some people feel a vague ache in the side.    Kidney stones may fall into the ureter. Some stones are tiny and pass through without causing symptoms. The ureter is a small tube (approximately 1/8 of an inch wide). A kidney stone can get stuck and block the ureter. If this happens, urine backs up and flows back to the kidney. Back pressure on the kidney can cause:  o Severe flank pain radiating to the groin.  o Severe nausea and vomiting.  o The pain can occur in the lower back, side, groin or all three.      When the stone reaches the lower ureter, this can irritate the bladder and sensations of feeling the urge to urinate frequently and urgently may occur.      Once the stone passes out of your ureter and into your bladder, the symptoms of urgency and frequency will often disappear. Sometimes pain will come back for a short period and will not be as severe as before. The passage of the stone from your bladder and out of your body is usually not a problem. The urethra is at least twice as wide as the normal ureter, so the stone doesn t usually block it.    Strain all urine  If you pass the stone, save it. Place it in the container we have provided and bring it to the [facility] within a week of passing it. Your stone will then be sent for analysis which takes about a month.     Signs and symptoms you might experience    Nausea    Decreased appetite    Urinary frequency    Bloody urine     Chills    Fatigue    When to call [facility] or  go to the Emergency Room    Fever with a temperature greater than 100.1    Severe pain    Persistent nausea/vomiting    If the pain worsens or nausea/vomiting is uncontrolled with medications, STOP eating & drinking. You need to have an empty stomach for 8 hours prior to surgery. Call the [facility] immediately at [phone].           Follow-up    Low dose CT scan with doctor visit 1-2 weeks after initial clinic visit per doctor s instructions    Please cancel the CT scan visit if you pass a stone. Reschedule for a one month follow-up with doctor to discuss stone composition and future prevention.    Preventing future stones    Approximately a month after your stone is sent out for analysis, a prevention visit will occur with your provider, to discuss an individualized plan for prevention of new stones and to discuss managing stones that you may still have. Along with the analysis of the kidney stone, blood and urine tests may be indicated to develop this plan. Knowing the type of kidney stones you make, and why, allows the providers at the [facility] to recommend specific ways to prevent them.    Follow-up visits are an important part of monitoring and preventing future re-occurrences.    The [facility] is available for questions or concerns 24 hours a day at [phone]         No

## 2025-09-01 ENCOUNTER — PATIENT OUTREACH (OUTPATIENT)
Dept: CARE COORDINATION | Facility: CLINIC | Age: 80
End: 2025-09-01

## 2025-09-03 ENCOUNTER — PATIENT OUTREACH (OUTPATIENT)
Dept: CARE COORDINATION | Facility: CLINIC | Age: 80
End: 2025-09-03